# Patient Record
Sex: MALE | URBAN - METROPOLITAN AREA
[De-identification: names, ages, dates, MRNs, and addresses within clinical notes are randomized per-mention and may not be internally consistent; named-entity substitution may affect disease eponyms.]

---

## 2023-04-26 ENCOUNTER — LAB OUTSIDE AN ENCOUNTER (OUTPATIENT)
Dept: URBAN - METROPOLITAN AREA CLINIC 54 | Facility: CLINIC | Age: 68
End: 2023-04-26

## 2023-04-26 ENCOUNTER — OFFICE VISIT (OUTPATIENT)
Dept: URBAN - METROPOLITAN AREA CLINIC 54 | Facility: CLINIC | Age: 68
End: 2023-04-26
Payer: COMMERCIAL

## 2023-04-26 ENCOUNTER — WEB ENCOUNTER (OUTPATIENT)
Dept: URBAN - METROPOLITAN AREA CLINIC 54 | Facility: CLINIC | Age: 68
End: 2023-04-26

## 2023-04-26 VITALS
HEIGHT: 70 IN | WEIGHT: 165.4 LBS | SYSTOLIC BLOOD PRESSURE: 125 MMHG | BODY MASS INDEX: 23.68 KG/M2 | HEART RATE: 78 BPM | DIASTOLIC BLOOD PRESSURE: 69 MMHG | TEMPERATURE: 98 F

## 2023-04-26 DIAGNOSIS — F10.10 ALCOHOL ABUSE: ICD-10-CM

## 2023-04-26 DIAGNOSIS — K76.0 FATTY LIVER: ICD-10-CM

## 2023-04-26 DIAGNOSIS — R11.2 ACUTE NAUSEA WITH NONBILIOUS VOMITING: ICD-10-CM

## 2023-04-26 DIAGNOSIS — Z12.11 COLON CANCER SCREENING: ICD-10-CM

## 2023-04-26 PROBLEM — 15167005: Status: ACTIVE | Noted: 2023-04-26

## 2023-04-26 PROBLEM — 305058001: Status: ACTIVE | Noted: 2023-04-26

## 2023-04-26 PROBLEM — 197321007: Status: ACTIVE | Noted: 2023-04-26

## 2023-04-26 PROCEDURE — 99204 OFFICE O/P NEW MOD 45 MIN: CPT | Performed by: STUDENT IN AN ORGANIZED HEALTH CARE EDUCATION/TRAINING PROGRAM

## 2023-04-26 PROCEDURE — 99244 OFF/OP CNSLTJ NEW/EST MOD 40: CPT | Performed by: STUDENT IN AN ORGANIZED HEALTH CARE EDUCATION/TRAINING PROGRAM

## 2023-04-26 RX ORDER — ONDANSETRON HYDROCHLORIDE 4 MG/1
1 TABLET TABLET, FILM COATED ORAL ONCE A DAY
Status: ACTIVE | COMMUNITY

## 2023-04-26 RX ORDER — TRAMADOL HYDROCHLORIDE 50 MG/1
1 TABLET AS NEEDED TABLET, FILM COATED ORAL ONCE A DAY
Status: ACTIVE | COMMUNITY

## 2023-04-26 RX ORDER — PANTOPRAZOLE SODIUM 40 MG/1
1 TABLET TABLET, DELAYED RELEASE ORAL ONCE A DAY
Status: ACTIVE | COMMUNITY

## 2023-04-26 RX ORDER — LIDOCAINE 50 MG/G
1 PATCH REMOVE AFTER 12 HOURS PATCH CUTANEOUS ONCE A DAY
Status: ACTIVE | COMMUNITY

## 2023-04-26 RX ORDER — DICLOFENAC SODIUM 10 MG/G
AS DIRECTED GEL TOPICAL
Status: ACTIVE | COMMUNITY

## 2023-04-26 RX ORDER — SERTRALINE HYDROCHLORIDE 50 MG/1
1 TABLET TABLET, FILM COATED ORAL ONCE A DAY
Status: ACTIVE | COMMUNITY

## 2023-04-26 NOTE — HPI-TODAY'S VISIT:
Mr. Hudson is a 68-year-old man referred by Dr. Joshua Alfonso for alcohol abuse and superficial gastritis without hemorrhage.  Patient's wife accompanies him today.  She states that patient has been having severe alcohol use since the death of his daughter within the last year.  Patient has been vomiting on and off intermittently for the last year.  This seems to be no provoking factor.  CT imaging shows diminished density compatible with fatty infiltration in his liver.  His most recent labs on record reveal WBC 6.5 hemoglobin 13.5 platelets 175, alkaline phosphatase within normal limits AST 31, ALT 29, total bilirubin 1.3.  He is in conversation with his primary care physician about ways to combat alcohol use disorder.

## 2023-05-01 ENCOUNTER — OFFICE VISIT (OUTPATIENT)
Dept: URBAN - METROPOLITAN AREA SURGERY CENTER 14 | Facility: SURGERY CENTER | Age: 68
End: 2023-05-01
Payer: COMMERCIAL

## 2023-05-01 ENCOUNTER — CLAIMS CREATED FROM THE CLAIM WINDOW (OUTPATIENT)
Dept: URBAN - METROPOLITAN AREA CLINIC 4 | Facility: CLINIC | Age: 68
End: 2023-05-01
Payer: COMMERCIAL

## 2023-05-01 DIAGNOSIS — K29.40 CHRONIC ATROPHIC GASTRITIS WITHOUT BLEEDING: ICD-10-CM

## 2023-05-01 DIAGNOSIS — K22.89 OTHER SPECIFIED DISEASE OF ESOPHAGUS: ICD-10-CM

## 2023-05-01 DIAGNOSIS — R11.2 NAUSEA AND VOMITING, UNSPECIFIED VOMITING TYPE: ICD-10-CM

## 2023-05-01 DIAGNOSIS — K29.60 OTHER GASTRITIS WITHOUT BLEEDING: ICD-10-CM

## 2023-05-01 DIAGNOSIS — K31.89 OTHER DISEASES OF STOMACH AND DUODENUM: ICD-10-CM

## 2023-05-01 PROCEDURE — 88342 IMHCHEM/IMCYTCHM 1ST ANTB: CPT | Performed by: PATHOLOGY

## 2023-05-01 PROCEDURE — 43239 EGD BIOPSY SINGLE/MULTIPLE: CPT | Performed by: STUDENT IN AN ORGANIZED HEALTH CARE EDUCATION/TRAINING PROGRAM

## 2023-05-01 PROCEDURE — 88305 TISSUE EXAM BY PATHOLOGIST: CPT | Performed by: PATHOLOGY

## 2023-05-01 PROCEDURE — G8907 PT DOC NO EVENTS ON DISCHARG: HCPCS | Performed by: STUDENT IN AN ORGANIZED HEALTH CARE EDUCATION/TRAINING PROGRAM

## 2023-05-01 RX ORDER — DICLOFENAC SODIUM 10 MG/G
AS DIRECTED GEL TOPICAL
Status: ACTIVE | COMMUNITY

## 2023-05-01 RX ORDER — SERTRALINE HYDROCHLORIDE 50 MG/1
1 TABLET TABLET, FILM COATED ORAL ONCE A DAY
Status: ACTIVE | COMMUNITY

## 2023-05-01 RX ORDER — ONDANSETRON HYDROCHLORIDE 4 MG/1
1 TABLET TABLET, FILM COATED ORAL ONCE A DAY
Status: ACTIVE | COMMUNITY

## 2023-05-01 RX ORDER — PANTOPRAZOLE SODIUM 40 MG/1
1 TABLET TABLET, DELAYED RELEASE ORAL ONCE A DAY
Status: ACTIVE | COMMUNITY

## 2023-05-01 RX ORDER — TRAMADOL HYDROCHLORIDE 50 MG/1
1 TABLET AS NEEDED TABLET, FILM COATED ORAL ONCE A DAY
Status: ACTIVE | COMMUNITY

## 2023-05-01 RX ORDER — LIDOCAINE 50 MG/G
1 PATCH REMOVE AFTER 12 HOURS PATCH CUTANEOUS ONCE A DAY
Status: ACTIVE | COMMUNITY

## 2023-05-16 ENCOUNTER — OFFICE VISIT (OUTPATIENT)
Dept: URBAN - METROPOLITAN AREA CLINIC 54 | Facility: CLINIC | Age: 68
End: 2023-05-16
Payer: COMMERCIAL

## 2023-05-16 ENCOUNTER — LAB OUTSIDE AN ENCOUNTER (OUTPATIENT)
Dept: URBAN - METROPOLITAN AREA CLINIC 54 | Facility: CLINIC | Age: 68
End: 2023-05-16

## 2023-05-16 VITALS
HEIGHT: 70 IN | TEMPERATURE: 97.1 F | DIASTOLIC BLOOD PRESSURE: 78 MMHG | BODY MASS INDEX: 23.51 KG/M2 | WEIGHT: 164.2 LBS | SYSTOLIC BLOOD PRESSURE: 122 MMHG | HEART RATE: 67 BPM

## 2023-05-16 DIAGNOSIS — Z12.11 COLON CANCER SCREENING: ICD-10-CM

## 2023-05-16 DIAGNOSIS — F10.10 ALCOHOL ABUSE: ICD-10-CM

## 2023-05-16 DIAGNOSIS — K31.A0 GASTRIC INTESTINAL METAPLASIA: ICD-10-CM

## 2023-05-16 DIAGNOSIS — K29.40 ATROPHIC GASTRITIS WITHOUT HEMORRHAGE: ICD-10-CM

## 2023-05-16 PROBLEM — 84568007: Status: ACTIVE | Noted: 2023-05-16

## 2023-05-16 PROCEDURE — 99214 OFFICE O/P EST MOD 30 MIN: CPT | Performed by: STUDENT IN AN ORGANIZED HEALTH CARE EDUCATION/TRAINING PROGRAM

## 2023-05-16 RX ORDER — SERTRALINE HYDROCHLORIDE 50 MG/1
1 TABLET TABLET, FILM COATED ORAL ONCE A DAY
Status: ACTIVE | COMMUNITY

## 2023-05-16 RX ORDER — LIDOCAINE 50 MG/G
1 PATCH REMOVE AFTER 12 HOURS PATCH CUTANEOUS ONCE A DAY
Status: ACTIVE | COMMUNITY

## 2023-05-16 RX ORDER — SODIUM, POTASSIUM,MAG SULFATES 17.5-3.13G
177 ML SOLUTION, RECONSTITUTED, ORAL ORAL AS DIRECTED
Qty: 177 MILLILITER | Refills: 0 | OUTPATIENT
Start: 2023-05-16 | End: 2023-05-17

## 2023-05-16 RX ORDER — DICLOFENAC SODIUM 10 MG/G
AS DIRECTED GEL TOPICAL
Status: ACTIVE | COMMUNITY

## 2023-05-16 RX ORDER — TRAMADOL HYDROCHLORIDE 50 MG/1
1 TABLET AS NEEDED TABLET, FILM COATED ORAL ONCE A DAY
Status: ACTIVE | COMMUNITY

## 2023-05-16 RX ORDER — PANTOPRAZOLE SODIUM 40 MG/1
1 TABLET TABLET, DELAYED RELEASE ORAL ONCE A DAY
Status: ACTIVE | COMMUNITY

## 2023-05-16 RX ORDER — ONDANSETRON HYDROCHLORIDE 4 MG/1
1 TABLET TABLET, FILM COATED ORAL ONCE A DAY
Status: ACTIVE | COMMUNITY

## 2023-05-16 NOTE — HPI-TODAY'S VISIT:
5/16/2023: Patient returns today for follow-up to discuss EGD. EGD was performed to evaluate for patient's nausea and vomiting.  EGD was remarkable for multiple plaques in the esophagus biopsied (no significant abnormality on pathology), erythematous mucosa in the stomach (Pathology results consistent with extensive gastric intestinal metaplasia and multifocal atrophic gastritis) and duodenal diverticulum.  Patient continues to drink a whole frequently.  Patient states that he continues to have nausea and vomiting.  Patient has never had a colonoscopy.

## 2023-05-21 LAB
ABSOLUTE BASOPHILS: 60
ABSOLUTE EOSINOPHILS: 150
ABSOLUTE LYMPHOCYTES: 888
ABSOLUTE MONOCYTES: 552
ABSOLUTE NEUTROPHILS: 4350
BASOPHILS: 1
EOSINOPHILS: 2.5
FOLATE (FOLIC ACID), SERUM: 12.7
HEMATOCRIT: 39.4
HEMOGLOBIN: 13.2
LYMPHOCYTES: 14.8
MCH: 28.7
MCHC: 33.5
MCV: 85.7
METHYLMALONIC ACID, SERUM: 115
MONOCYTES: 9.2
MPV: 9.4
NEUTROPHILS: 72.5
PLATELET COUNT: 196
RDW: 13.9
RED BLOOD CELL COUNT: 4.6
VITAMIN B12: 397
WHITE BLOOD CELL COUNT: 6

## 2023-06-01 ENCOUNTER — CLAIMS CREATED FROM THE CLAIM WINDOW (OUTPATIENT)
Dept: URBAN - METROPOLITAN AREA CLINIC 4 | Facility: CLINIC | Age: 68
End: 2023-06-01
Payer: COMMERCIAL

## 2023-06-01 ENCOUNTER — OFFICE VISIT (OUTPATIENT)
Dept: URBAN - METROPOLITAN AREA SURGERY CENTER 14 | Facility: SURGERY CENTER | Age: 68
End: 2023-06-01
Payer: COMMERCIAL

## 2023-06-01 DIAGNOSIS — K63.89 OTHER SPECIFIED DISEASES OF INTESTINE: ICD-10-CM

## 2023-06-01 DIAGNOSIS — Z12.11 COLON CANCER SCREENING: ICD-10-CM

## 2023-06-01 DIAGNOSIS — K63.3 EROSION OF COLON: ICD-10-CM

## 2023-06-01 PROCEDURE — 88305 TISSUE EXAM BY PATHOLOGIST: CPT | Performed by: PATHOLOGY

## 2023-06-01 PROCEDURE — 45380 COLONOSCOPY AND BIOPSY: CPT | Performed by: STUDENT IN AN ORGANIZED HEALTH CARE EDUCATION/TRAINING PROGRAM

## 2023-06-01 PROCEDURE — G8907 PT DOC NO EVENTS ON DISCHARG: HCPCS | Performed by: STUDENT IN AN ORGANIZED HEALTH CARE EDUCATION/TRAINING PROGRAM

## 2023-06-01 RX ORDER — PANTOPRAZOLE SODIUM 40 MG/1
1 TABLET TABLET, DELAYED RELEASE ORAL ONCE A DAY
Status: ACTIVE | COMMUNITY

## 2023-06-01 RX ORDER — LIDOCAINE 50 MG/G
1 PATCH REMOVE AFTER 12 HOURS PATCH CUTANEOUS ONCE A DAY
Status: ACTIVE | COMMUNITY

## 2023-06-01 RX ORDER — SERTRALINE HYDROCHLORIDE 50 MG/1
1 TABLET TABLET, FILM COATED ORAL ONCE A DAY
Status: ACTIVE | COMMUNITY

## 2023-06-01 RX ORDER — ONDANSETRON HYDROCHLORIDE 4 MG/1
1 TABLET TABLET, FILM COATED ORAL ONCE A DAY
Status: ACTIVE | COMMUNITY

## 2023-06-01 RX ORDER — TRAMADOL HYDROCHLORIDE 50 MG/1
1 TABLET AS NEEDED TABLET, FILM COATED ORAL ONCE A DAY
Status: ACTIVE | COMMUNITY

## 2023-06-01 RX ORDER — DICLOFENAC SODIUM 10 MG/G
AS DIRECTED GEL TOPICAL
Status: ACTIVE | COMMUNITY

## 2023-06-21 ENCOUNTER — OFFICE VISIT (OUTPATIENT)
Dept: URBAN - METROPOLITAN AREA CLINIC 54 | Facility: CLINIC | Age: 68
End: 2023-06-21

## 2023-07-11 ENCOUNTER — DASHBOARD ENCOUNTERS (OUTPATIENT)
Age: 68
End: 2023-07-11

## 2023-07-11 ENCOUNTER — OFFICE VISIT (OUTPATIENT)
Dept: URBAN - METROPOLITAN AREA CLINIC 54 | Facility: CLINIC | Age: 68
End: 2023-07-11
Payer: COMMERCIAL

## 2023-07-11 VITALS
HEIGHT: 70 IN | TEMPERATURE: 98.1 F | DIASTOLIC BLOOD PRESSURE: 81 MMHG | WEIGHT: 164.4 LBS | SYSTOLIC BLOOD PRESSURE: 124 MMHG | BODY MASS INDEX: 23.54 KG/M2 | HEART RATE: 76 BPM

## 2023-07-11 DIAGNOSIS — Z12.11 COLON CANCER SCREENING: ICD-10-CM

## 2023-07-11 PROCEDURE — 99213 OFFICE O/P EST LOW 20 MIN: CPT | Performed by: STUDENT IN AN ORGANIZED HEALTH CARE EDUCATION/TRAINING PROGRAM

## 2023-07-11 RX ORDER — SERTRALINE HYDROCHLORIDE 50 MG/1
1 TABLET TABLET, FILM COATED ORAL ONCE A DAY
Status: ACTIVE | COMMUNITY

## 2023-07-11 NOTE — HPI-TODAY'S VISIT:
--7/11/2023-- Colonoscopy was significant for abnormal mucosa in the transverse colon.  Biopsies consistent with bowel prep effect.  No polyps.  Patient today states that he feels significantly better without diarrhea, blood in his urine or nausea and vomiting.  He states he has significantly decreased the amount of alcohol he takes.  However, he has not yet quit.